# Patient Record
Sex: FEMALE | Race: WHITE | Employment: UNEMPLOYED | URBAN - METROPOLITAN AREA
[De-identification: names, ages, dates, MRNs, and addresses within clinical notes are randomized per-mention and may not be internally consistent; named-entity substitution may affect disease eponyms.]

---

## 2019-03-04 ENCOUNTER — OFFICE VISIT (OUTPATIENT)
Dept: OBGYN | Facility: CLINIC | Age: 19
End: 2019-03-04
Payer: COMMERCIAL

## 2019-03-04 VITALS
HEIGHT: 67 IN | RESPIRATION RATE: 18 BRPM | HEART RATE: 85 BPM | TEMPERATURE: 97.2 F | DIASTOLIC BLOOD PRESSURE: 85 MMHG | SYSTOLIC BLOOD PRESSURE: 123 MMHG | WEIGHT: 273 LBS | BODY MASS INDEX: 42.85 KG/M2

## 2019-03-04 DIAGNOSIS — L90.0 LICHEN SCLEROSUS: ICD-10-CM

## 2019-03-04 DIAGNOSIS — B37.31 YEAST INFECTION OF THE VAGINA: ICD-10-CM

## 2019-03-04 DIAGNOSIS — N89.8 VAGINAL DISCHARGE: Primary | ICD-10-CM

## 2019-03-04 DIAGNOSIS — E66.01 MORBID OBESITY (H): ICD-10-CM

## 2019-03-04 DIAGNOSIS — Z11.3 SCREEN FOR STD (SEXUALLY TRANSMITTED DISEASE): ICD-10-CM

## 2019-03-04 PROBLEM — F90.0 ATTENTION DEFICIT HYPERACTIVITY DISORDER (ADHD), PREDOMINANTLY INATTENTIVE TYPE: Status: ACTIVE | Noted: 2019-03-04

## 2019-03-04 PROBLEM — F34.1 DYSTHYMIA: Status: ACTIVE | Noted: 2019-03-04

## 2019-03-04 LAB
SPECIMEN SOURCE: ABNORMAL
WET PREP SPEC: ABNORMAL

## 2019-03-04 PROCEDURE — 99203 OFFICE O/P NEW LOW 30 MIN: CPT | Performed by: OBSTETRICS & GYNECOLOGY

## 2019-03-04 PROCEDURE — 87210 SMEAR WET MOUNT SALINE/INK: CPT | Performed by: OBSTETRICS & GYNECOLOGY

## 2019-03-04 PROCEDURE — 87591 N.GONORRHOEAE DNA AMP PROB: CPT | Performed by: OBSTETRICS & GYNECOLOGY

## 2019-03-04 PROCEDURE — 87491 CHLMYD TRACH DNA AMP PROBE: CPT | Performed by: OBSTETRICS & GYNECOLOGY

## 2019-03-04 RX ORDER — TOPIRAMATE 25 MG/1
25 TABLET, FILM COATED ORAL 2 TIMES DAILY
Qty: 60 TABLET | Refills: 2 | Status: SHIPPED | OUTPATIENT
Start: 2019-03-04 | End: 2019-08-14

## 2019-03-04 RX ORDER — ESCITALOPRAM OXALATE 10 MG/1
10 TABLET ORAL DAILY
COMMUNITY

## 2019-03-04 RX ORDER — ATOMOXETINE 25 MG/1
25 CAPSULE ORAL DAILY
COMMUNITY

## 2019-03-04 RX ORDER — PHENTERMINE HYDROCHLORIDE 15 MG/1
15 CAPSULE ORAL EVERY MORNING
Qty: 30 CAPSULE | Refills: 2 | Status: SHIPPED | OUTPATIENT
Start: 2019-03-04 | End: 2019-08-14

## 2019-03-04 RX ORDER — MELATONIN 10 MG
CAPSULE ORAL
COMMUNITY

## 2019-03-04 RX ORDER — ESCITALOPRAM OXALATE 20 MG/1
20 TABLET ORAL DAILY
COMMUNITY

## 2019-03-04 RX ORDER — TRIAMCINOLONE ACETONIDE 1 MG/G
OINTMENT TOPICAL 2 TIMES DAILY
Qty: 80 G | Refills: 3 | Status: SHIPPED | OUTPATIENT
Start: 2019-03-04 | End: 2020-07-24

## 2019-03-04 RX ORDER — TRIAMCINOLONE ACETONIDE 1 MG/G
OINTMENT TOPICAL 2 TIMES DAILY
COMMUNITY

## 2019-03-04 RX ORDER — LEVONORGESTREL/ETHIN.ESTRADIOL 0.1-0.02MG
1 TABLET ORAL DAILY
COMMUNITY
End: 2020-07-24

## 2019-03-04 RX ORDER — FLUCONAZOLE 150 MG/1
150 TABLET ORAL ONCE
Qty: 10 TABLET | Refills: 0 | Status: SHIPPED | OUTPATIENT
Start: 2019-03-04 | End: 2019-03-04

## 2019-03-04 SDOH — HEALTH STABILITY: MENTAL HEALTH: HOW OFTEN DO YOU HAVE A DRINK CONTAINING ALCOHOL?: NEVER

## 2019-03-04 ASSESSMENT — MIFFLIN-ST. JEOR: SCORE: 2045.95

## 2019-03-04 NOTE — PROGRESS NOTES
"Kendal is a 18 year old No obstetric history on file.  female who presents for advice regarding \"lichen sclerosis\", a diagnosis assigned by outside provider by visual inspection and complaint of chronic external vaginal itching and burning.  She states she is not sexually active, reports some white discharge; she uses Triamcinolone ointment up to twice a day, often several times per week to control her symptoms she has no fever or chills  She uses tampons and pads for a monthly 5 days menstruation.  She has no other rashes  She uses no other topical products..    Patient Active Problem List    Diagnosis Date Noted     Attention deficit hyperactivity disorder (ADHD), predominantly inattentive type 03/04/2019     Priority: Medium     Dysthymia 03/04/2019     Priority: Medium       All systems were reviewed and pertinent information in noted in subjective/HPI.    No past medical history on file.    No past surgical history on file.      Current Outpatient Medications:      atomoxetine (STRATTERA) 25 MG capsule, Take 25 mg by mouth daily, Disp: , Rfl:      escitalopram (LEXAPRO) 10 MG tablet, Take 10 mg by mouth daily, Disp: , Rfl:      escitalopram (LEXAPRO) 20 MG tablet, Take 20 mg by mouth daily, Disp: , Rfl:      fluconazole (DIFLUCAN) 150 MG tablet, Take 1 tablet (150 mg) by mouth once for 1 dose Repeat dose every 3 days x 3 doses, then weekly, Disp: 10 tablet, Rfl: 0     levonorgestrel-ethinyl estradiol (AVIANE/ALESSE/LESSINA) 0.1-20 MG-MCG tablet, Take 1 tablet by mouth daily, Disp: , Rfl:      Melatonin 10 MG CAPS, , Disp: , Rfl:      phentermine (ADIPEX-P) 15 MG capsule, Take 1 capsule (15 mg) by mouth every morning, Disp: 30 capsule, Rfl: 2     topiramate (TOPAMAX) 25 MG tablet, Take 1 tablet (25 mg) by mouth 2 times daily, Disp: 60 tablet, Rfl: 2     triamcinolone (KENALOG) 0.1 % external ointment, Apply topically 2 times daily, Disp: , Rfl:      triamcinolone (KENALOG) 0.1 % external ointment, Apply " "topically 2 times daily, Disp: 80 g, Rfl: 3    ALLERGIES:  Penicillin g    Social History     Socioeconomic History     Marital status: Unknown     Spouse name: None     Number of children: None     Years of education: None     Highest education level: None   Occupational History     None   Social Needs     Financial resource strain: None     Food insecurity:     Worry: None     Inability: None     Transportation needs:     Medical: None     Non-medical: None   Tobacco Use     Smoking status: Never Smoker     Smokeless tobacco: Never Used   Substance and Sexual Activity     Alcohol use: No     Frequency: Never     Drug use: None     Sexual activity: Not Currently     Birth control/protection: Pill   Lifestyle     Physical activity:     Days per week: None     Minutes per session: None     Stress: None   Relationships     Social connections:     Talks on phone: None     Gets together: None     Attends Muslim service: None     Active member of club or organization: None     Attends meetings of clubs or organizations: None     Relationship status: None     Intimate partner violence:     Fear of current or ex partner: None     Emotionally abused: None     Physically abused: None     Forced sexual activity: None   Other Topics Concern     None   Social History Narrative     None       No family history on file.    OBJECTIVE:  Vitals: /85 (BP Location: Right arm, Patient Position: Chair, Cuff Size: Adult Large)   Pulse 85   Temp 97.2  F (36.2  C) (Tympanic)   Resp 18   Ht 1.702 m (5' 7\")   Wt 123.8 kg (273 lb)   LMP 02/11/2019   BMI 42.76 kg/m   BMI= Body mass index is 42.76 kg/m .   Patient's last menstrual period was 02/11/2019.     GENERAL APPEARANCE: overweight, nonhirsuit appearing  ABDOMEN:  soft, nontender, no hepato-splenomegaly or hernias  PELVIC:  EGBUS:  Thinned labia but no leukoplakia or changes classic for LSA  VAGINA:  Erythematous walls with flocculent white discharge; wet " mount--yeast  CERVIX:  smooth, non-friable, no gross lesions, thin-layer PAP was not taken   UTERUS:  anteverted, not enlarged, non tender  ADNEXAE:  non-tender, no masses palpable, no cul de sac nodularity    ASSESSMENT:      ICD-10-CM    1. Vaginal discharge N89.8 Wet prep   2. Lichen sclerosus L90.0 triamcinolone (KENALOG) 0.1 % external ointment   3. Yeast infection of the vagina B37.3 fluconazole (DIFLUCAN) 150 MG tablet   4. Morbid obesity (H) E66.01 phentermine (ADIPEX-P) 15 MG capsule     topiramate (TOPAMAX) 25 MG tablet   5. Screen for STD (sexually transmitted disease) Z11.3 Chlamydia trachomatis PCR     Neisseria gonorrhoeae PCR       PLAN:  I discussed at length with Elma that her symptoms may be more of a yeast issue than true LSA; I would suggest an aggressive tx with Fluconazole and use of Triamcinolone very judiciously  I also suggest an effort to assist her in weight loss; we discussed healthy eating and activity, and use of Phentermine 15mg po every day and Topamax 25mg po BID (start lower dose) with f/u in 3 months to see if can increase dose; pt is amenable      Duration of visit:  30 minutes, >50% in discussion of current issues, treatment options and treatment planning.  JOE Child MD

## 2019-03-05 LAB
C TRACH DNA SPEC QL NAA+PROBE: NEGATIVE
N GONORRHOEA DNA SPEC QL NAA+PROBE: NEGATIVE
SPECIMEN SOURCE: NORMAL
SPECIMEN SOURCE: NORMAL

## 2019-03-06 ENCOUNTER — TELEPHONE (OUTPATIENT)
Dept: OBGYN | Facility: CLINIC | Age: 19
End: 2019-03-06

## 2019-03-06 NOTE — TELEPHONE ENCOUNTER
Prior Authorization Retail Medication Request    Medication/Dose: Phentermine HCL  ICD code (if different than what is on RX):  Morbid obesity (H) [E66.01]   Previously Tried and Failed:  I also suggest an effort to assist her in weight loss; we discussed healthy eating and activity, and use of Phentermine 15mg po every day and Topamax 25mg po BID (start lower dose) with f/u in 3 months to see if can increase dose; pt is amenable  Rationale:      Insurance Name:  [employer]   Insurance ID:  Key [id number]      Pharmacy Information (if different than what is on RX)  [facility]  [facility]  Phone:  [phone]

## 2019-03-11 NOTE — TELEPHONE ENCOUNTER
Central Prior Authorization Team   Phone: [phone]      PA Initiation    Medication: Phentermine HCL  Insurance Company: [employer] - Phone [phone] Fax [phone]  Pharmacy Filling the Rx: [id number] - [place], [place] - [address]  Filling Pharmacy Phone: [phone]  Filling Pharmacy Fax:    Start Date: 3/11/2019

## 2019-03-11 NOTE — TELEPHONE ENCOUNTER
PRIOR AUTHORIZATION DENIED    Medication: Phentermine HCL    Denial Date: 3/11/2019    Denial Rational:      Appeal Information:    If you would like to appeal, please supply P/A team with a letter of medical necessity with clinical reason.

## 2019-03-12 NOTE — TELEPHONE ENCOUNTER
Medication Appeal Initiation    We have initiated an appeal for the requested medication:  Medication: Phentermine HCL  Appeal Start Date:  3/12/2019  Insurance Company: [employer] - Phone [phone] Fax [phone]  Comments:

## 2019-03-13 NOTE — TELEPHONE ENCOUNTER
MEDICATION APPEAL DENIED    Medication: Phentermine HCL    Denial Date: 3/13/2019    Denial Rational:  Per insurance, medication is excluded from patients benefit plan and will not be covered.      Second Level Appeal Information:      Second level appeals will be managed by the clinic staff and provider. Please contact the [employer] Prior Authorization Team if additional information about the denial is needed.

## 2019-03-14 NOTE — TELEPHONE ENCOUNTER
Pls notify pt that her insurance has denied the Phentermine even after appeal. She will need to decide if she can afford to pay out of pocket   Vanessa Mericle    Routing Comment      Call to patient to notify of above.  Patient gave permission to discuss with mother. Mother notified of denial. She will pay out of pocket and  med at the pharmacy.    Norma Olson   [facility]  RN

## 2019-06-25 DIAGNOSIS — E66.01 MORBID OBESITY (H): ICD-10-CM

## 2019-06-25 NOTE — TELEPHONE ENCOUNTER
Please review and advise on patient request for refill.  Outside of RN guidelines for refill.    Thank you.    Norma Olson   [facility]  RN

## 2019-06-26 RX ORDER — TOPIRAMATE 25 MG/1
25 TABLET, FILM COATED ORAL 2 TIMES DAILY
Qty: 60 TABLET | Refills: 2 | OUTPATIENT
Start: 2019-06-26

## 2019-06-28 NOTE — TELEPHONE ENCOUNTER
Pt was advised that rx for Topamax was denied and that she needs to be seen for an office visit.    Jenny Gilliam  [facility] RN

## 2019-07-03 DIAGNOSIS — E66.01 MORBID OBESITY (H): ICD-10-CM

## 2019-07-03 NOTE — TELEPHONE ENCOUNTER
Refill request received again for Topiramate.  Prescription denied by Dr. Child as patient needs to be seen for follow up.  Pharmacy notified.  Patient notified 6/28/19.    Norma Olson   [facility]  [facility]

## 2019-08-14 ENCOUNTER — OFFICE VISIT (OUTPATIENT)
Dept: OBGYN | Facility: CLINIC | Age: 19
End: 2019-08-14
Payer: MEDICARE

## 2019-08-14 ENCOUNTER — TELEPHONE (OUTPATIENT)
Dept: OBGYN | Facility: CLINIC | Age: 19
End: 2019-08-14

## 2019-08-14 VITALS
DIASTOLIC BLOOD PRESSURE: 74 MMHG | TEMPERATURE: 97.7 F | WEIGHT: 258.5 LBS | RESPIRATION RATE: 18 BRPM | HEART RATE: 90 BPM | SYSTOLIC BLOOD PRESSURE: 121 MMHG | BODY MASS INDEX: 40.57 KG/M2 | HEIGHT: 67 IN

## 2019-08-14 DIAGNOSIS — E66.01 MORBID OBESITY (H): ICD-10-CM

## 2019-08-14 PROCEDURE — 99213 OFFICE O/P EST LOW 20 MIN: CPT | Performed by: OBSTETRICS & GYNECOLOGY

## 2019-08-14 RX ORDER — TOPIRAMATE 50 MG/1
50 TABLET, FILM COATED ORAL 2 TIMES DAILY
Qty: 180 TABLET | Refills: 3 | Status: SHIPPED | OUTPATIENT
Start: 2019-08-14

## 2019-08-14 ASSESSMENT — MIFFLIN-ST. JEOR: SCORE: 1980.18

## 2019-08-14 NOTE — NURSING NOTE
"Initial /74 (BP Location: Right arm, Patient Position: Chair, Cuff Size: Adult Large)   Pulse 90   Temp 97.7  F (36.5  C) (Tympanic)   Resp 18   Ht 1.702 m (5' 7\")   Wt 117.3 kg (258 lb 8 oz)   LMP 07/30/2019   Breastfeeding? No   BMI 40.49 kg/m   Estimated body mass index is 40.49 kg/m  as calculated from the following:    Height as of this encounter: 1.702 m (5' 7\").    Weight as of this encounter: 117.3 kg (258 lb 8 oz). .    Cherie Saavedra, BETI    "

## 2019-08-14 NOTE — TELEPHONE ENCOUNTER
Prior Authorization Retail Medication Request    Medication/Dose: Topiramate 50 mg  ICD code (if different than what is on RX):    Previously Tried and Failed:    Rationale:      Insurance Name:  [employer]  Insurance ID:  [id number]       Pharmacy Information (if different than what is on RX)  [facility]  [facility] [place]  Phone:  [phone]

## 2019-08-14 NOTE — PROGRESS NOTES
Elma is a 18 year old No obstetric history on file.  female who presents for f/u of weight loss from Topamax 25mg po BID; she never took Phentermine as too expensive; she has lost 15 lbs, rides her bike daily, tries to avoid certain foods, but admits she can do better  She has had some episodic headaches; is currently on 20mcg E2 containing BCP.    Patient Active Problem List    Diagnosis Date Noted     Attention deficit hyperactivity disorder (ADHD), predominantly inattentive type 03/04/2019     Priority: Medium     Dysthymia 03/04/2019     Priority: Medium     Morbid obesity (H) 03/04/2019     Priority: Medium     On Phentermine/Topamax since 3/19       Lichen sclerosus 03/04/2019     Priority: Medium       All systems were reviewed and pertinent information in noted in subjective/HPI.    Past Medical History:   Diagnosis Date     Active autistic disorder     10/14/2013     Attention deficit disorder with hyperactivity     10/14/2013     Condition influencing health status     she is a twin     Disturbance of skin sensation     10/14/2013     IBS (irritable bowel syndrome)      Nonpsychotic mental disorder     No Comments Provided     Personality disorder (H)     10/14/2013       Past Surgical History:   Procedure Laterality Date     CHOLECYSTECTOMY       OTHER SURGICAL HISTORY      [id number],NO PREVIOUS SURGERY         Current Outpatient Medications:      atomoxetine (STRATTERA) 25 MG capsule, Take 25 mg by mouth daily, Disp: , Rfl:      escitalopram (LEXAPRO) 10 MG tablet, Take 10 mg by mouth daily, Disp: , Rfl:      escitalopram (LEXAPRO) 20 MG tablet, Take 20 mg by mouth daily, Disp: , Rfl:      levonorgestrel-ethinyl estradiol (AVIANE/ALESSE/LESSINA) 0.1-20 MG-MCG tablet, Take 1 tablet by mouth daily, Disp: , Rfl:      Melatonin 10 MG CAPS, , Disp: , Rfl:      topiramate (TOPAMAX) 50 MG tablet, Take 1 tablet (50 mg) by mouth 2 times daily, Disp: 180 tablet, Rfl: 3     triamcinolone (KENALOG) 0.1 % external  ointment, Apply topically 2 times daily, Disp: , Rfl:      triamcinolone (KENALOG) 0.1 % external ointment, Apply topically 2 times daily, Disp: 80 g, Rfl: 3    ALLERGIES:  Penicillin g    Social History     Socioeconomic History     Marital status: Unknown     Spouse name: None     Number of children: None     Years of education: None     Highest education level: None   Occupational History     None   Social Needs     Financial resource strain: None     Food insecurity:     Worry: None     Inability: None     Transportation needs:     Medical: None     Non-medical: None   Tobacco Use     Smoking status: Never Smoker     Smokeless tobacco: Never Used   Substance and Sexual Activity     Alcohol use: No     Frequency: Never     Drug use: Unknown     Types: Other     Comment: Drug use: No     Sexual activity: Not Currently     Birth control/protection: Pill   Lifestyle     Physical activity:     Days per week: None     Minutes per session: None     Stress: None   Relationships     Social connections:     Talks on phone: None     Gets together: None     Attends Orthodoxy service: None     Active member of club or organization: None     Attends meetings of clubs or organizations: None     Relationship status: None     Intimate partner violence:     Fear of current or ex partner: None     Emotionally abused: None     Physically abused: None     Forced sexual activity: None   Other Topics Concern     None   Social History Narrative    ** Merged History Encounter **         10/21/2013  Living at [facility], for anger/behavior issues.  When she's at home, she lives with mom, step-dad and step-sibling.  3/21/2014  Family lives near [place].  7/15/2014  She will be discharging from [facility] on August 1, 2014, after that w    ill be living with a foster home in [place], [place]. After several months with a foster home she may be able to go back home with her parents.       Family History   Problem Relation Age of Onset  "    Other - See Comments Other         irritable bowel in a cousin and aunt     Other - See Comments Mother         migraines       OBJECTIVE:  Vitals: /74 (BP Location: Right arm, Patient Position: Chair, Cuff Size: Adult Large)   Pulse 90   Temp 97.7  F (36.5  C) (Tympanic)   Resp 18   Ht 1.702 m (5' 7\")   Wt 117.3 kg (258 lb 8 oz)   LMP 07/30/2019   Breastfeeding? No   BMI 40.49 kg/m   BMI= Body mass index is 40.49 kg/m .   Patient's last menstrual period was 07/30/2019.     GENERAL APPEARANCE: healthy, alert and no distress    ASSESSMENT:      ICD-10-CM    1. Morbid obesity (H) E66.01 topiramate (TOPAMAX) 50 MG tablet       PLAN:  I recommend increasing the Topamax to 50mg po BID, especially since she is having headaches; we discussed that if her headaches continue, should change to progestin only BCP  Discussed dietary adjustments, drinking lots of water and continueing her daily exercise'f/u 6 months.    Joe Chery MD    Duration of visit:  15 minutes, 100% in discussion of current issues, treatment options and treatment planning.  JOE CHERY MD      "

## 2019-08-20 NOTE — TELEPHONE ENCOUNTER
Received fax from  to complete on Topiramate request.  Form filled out and faxed back to  - await response.    -Neida EVANS [facility]  [facility] [place]

## 2019-08-20 NOTE — TELEPHONE ENCOUNTER
"Received response from [employer]  \"This [employer] member is enrolled in a limited plan that covers Part B, Anti-Psychotic and OTC's\"    Pt informed.    -Neida Siegel  Clinic Station         "

## 2019-08-22 NOTE — TELEPHONE ENCOUNTER
Sabrina is calling and states that this PA should be submitted to [employer] (pt's prescription coverage)    Please fax PA to:  [phone]    Phone #:  [phone]    Denise Behrendt  [facility] CSS

## 2019-08-22 NOTE — TELEPHONE ENCOUNTER
CENTRAL PRIOR AUTHORIZATION  [phone]    PA Initiation    Medication: Topiramate  Insurance Company: [employer] - Phone [phone] Fax [phone]  Pharmacy Filling the Rx:    Filling Pharmacy Phone:    Filling Pharmacy Fax:    Start Date: 8/22/2019

## 2019-08-23 NOTE — TELEPHONE ENCOUNTER
Prior Authorization Not Needed per Insurance    Medication: Topiramate - PA Not Needed  Insurance Company: [employer] - Phone [phone] Fax [phone]  Expected CoPay:      Pharmacy Filling the Rx: [id number] - [place], [place] - [address]  Pharmacy Notified: Yes They were able to get it processed.  Patient Notified: Yes

## 2020-07-24 ENCOUNTER — VIRTUAL VISIT (OUTPATIENT)
Dept: OBGYN | Facility: CLINIC | Age: 20
End: 2020-07-24
Payer: MEDICARE

## 2020-07-24 VITALS — BODY MASS INDEX: 35.87 KG/M2 | WEIGHT: 229 LBS

## 2020-07-24 DIAGNOSIS — Z30.41 ENCOUNTER FOR SURVEILLANCE OF CONTRACEPTIVE PILLS: ICD-10-CM

## 2020-07-24 DIAGNOSIS — L90.0 LICHEN SCLEROSUS: ICD-10-CM

## 2020-07-24 DIAGNOSIS — E66.01 MORBID OBESITY (H): Primary | ICD-10-CM

## 2020-07-24 PROCEDURE — 99442 ZZC PHYSICIAN TELEPHONE EVALUATION 11-20 MIN: CPT | Performed by: OBSTETRICS & GYNECOLOGY

## 2020-07-24 RX ORDER — LEVONORGESTREL/ETHIN.ESTRADIOL 0.1-0.02MG
1 TABLET ORAL DAILY
Qty: 84 TABLET | Refills: 3 | Status: SHIPPED | OUTPATIENT
Start: 2020-07-24

## 2020-07-24 RX ORDER — TOPIRAMATE 50 MG/1
50 TABLET, FILM COATED ORAL DAILY
Qty: 90 TABLET | Refills: 3 | Status: SHIPPED | OUTPATIENT
Start: 2020-07-24

## 2020-07-24 RX ORDER — LURASIDONE HYDROCHLORIDE 20 MG/1
TABLET, FILM COATED ORAL
COMMUNITY

## 2020-07-24 RX ORDER — TRIAMCINOLONE ACETONIDE 1 MG/G
OINTMENT TOPICAL 2 TIMES DAILY
Qty: 80 G | Refills: 3 | Status: SHIPPED | OUTPATIENT
Start: 2020-07-24

## 2020-07-24 NOTE — PROGRESS NOTES
"Elma King is a 19 year old female who is being evaluated via a billable telephone visit.      The patient has been notified of following:     \"This telephone visit will be conducted via a call between you and your physician/provider. We have found that certain health care needs can be provided without the need for a physical exam.  This service lets us provide the care you need with a short phone conversation.  If a prescription is necessary we can send it directly to your pharmacy.  If lab work is needed we can place an order for that and you can then stop by our lab to have the test done at a later time.    Telephone visits are billed at different rates depending on your insurance coverage. During this emergency period, for some insurers they may be billed the same as an in-person visit.  Please reach out to your insurance provider with any questions.    If during the course of the call the physician/provider feels a telephone visit is not appropriate, you will not be charged for this service.\"    Patient has given verbal consent for Telephone visit?  Yes    What phone number would you like to be contacted at? [phone] Hyun is mom and Elma will be there at 3:00.    How would you like to obtain your AVS? Mail a copy    Phone call duration: 15 minutes    Vanessa Child MD    Elma is a 19 year old   female who presents for virtual visit to review for med refills; she is currentlyl taking a low dose BCP, Topamax 50mg every day (pt decreased from BID) and Triamcinolone ointment prn lichen sclerosis symptoms.  She states 2 weeks ago her weight was measured at 230 lbs, which means she has lost 43 lbs since starting the Topamax and seems to be keeping it off as well; she would like to remain on the Topamax.  She has regular menses with her BCP and she uses the Triamcinolone sparingly like she was advised.  She reports no problems.    Patient Active Problem List    Diagnosis Date Noted     " Attention deficit hyperactivity disorder (ADHD), predominantly inattentive type 03/04/2019     Priority: Medium     Dysthymia 03/04/2019     Priority: Medium     Morbid obesity (H) 03/04/2019     Priority: Medium     On Phentermine/Topamax since 3/19       Lichen sclerosus 03/04/2019     Priority: Medium       All systems were reviewed and pertinent information in noted in subjective/HPI.    Past Medical History:   Diagnosis Date     Active autistic disorder     10/14/2013     Attention deficit disorder with hyperactivity     10/14/2013     Condition influencing health status     she is a twin     Disturbance of skin sensation     10/14/2013     IBS (irritable bowel syndrome)      Nonpsychotic mental disorder     No Comments Provided     Personality disorder (H)     10/14/2013       Past Surgical History:   Procedure Laterality Date     CHOLECYSTECTOMY       OTHER SURGICAL HISTORY      [id number],NO PREVIOUS SURGERY         Current Outpatient Medications:      atomoxetine (STRATTERA) 25 MG capsule, Take 25 mg by mouth daily, Disp: , Rfl:      escitalopram (LEXAPRO) 10 MG tablet, Take 10 mg by mouth daily, Disp: , Rfl:      levonorgestrel-ethinyl estradiol (AVIANE) 0.1-20 MG-MCG tablet, Take 1 tablet by mouth daily, Disp: 84 tablet, Rfl: 3     lurasidone (LATUDA) 20 MG TABS tablet, Take by mouth daily with food, Disp: , Rfl:      Melatonin 10 MG CAPS, , Disp: , Rfl:      topiramate (TOPAMAX) 50 MG tablet, Take 1 tablet (50 mg) by mouth daily, Disp: 90 tablet, Rfl: 3     topiramate (TOPAMAX) 50 MG tablet, Take 1 tablet (50 mg) by mouth 2 times daily (Patient taking differently: Take 25 mg by mouth 2 times daily ), Disp: 180 tablet, Rfl: 3     triamcinolone (KENALOG) 0.1 % external ointment, Apply topically 2 times daily, Disp: 80 g, Rfl: 3     triamcinolone (KENALOG) 0.1 % external ointment, Apply topically 2 times daily, Disp: , Rfl:      escitalopram (LEXAPRO) 20 MG tablet, Take 20 mg by mouth daily, Disp: , Rfl:      ALLERGIES:  Penicillin g    Social History     Socioeconomic History     Marital status: Unknown     Spouse name: None     Number of children: None     Years of education: None     Highest education level: None   Occupational History     None   Social Needs     Financial resource strain: None     Food insecurity     Worry: None     Inability: None     Transportation needs     Medical: None     Non-medical: None   Tobacco Use     Smoking status: Never Smoker     Smokeless tobacco: Never Used   Substance and Sexual Activity     Alcohol use: No     Frequency: Never     Drug use: Unknown     Types: Other     Comment: Drug use: No     Sexual activity: Not Currently     Birth control/protection: Pill   Lifestyle     Physical activity     Days per week: None     Minutes per session: None     Stress: None   Relationships     Social connections     Talks on phone: None     Gets together: None     Attends Voodoo service: None     Active member of club or organization: None     Attends meetings of clubs or organizations: None     Relationship status: None     Intimate partner violence     Fear of current or ex partner: None     Emotionally abused: None     Physically abused: None     Forced sexual activity: None   Other Topics Concern     None   Social History Narrative    ** Merged History Encounter **         10/21/2013  Living at [facility], for anger/behavior issues.  When she's at home, she lives with mom, step-dad and step-sibling.  3/21/2014  Family lives near [place].  7/15/2014  She will be discharging from [facility] on August 1, 2014, after that w    ill be living with a foster home in [place], [place]. After several months with a foster home she may be able to go back home with her parents.       Family History   Problem Relation Age of Onset     Other - See Comments Other         irritable bowel in a cousin and aunt     Other - See Comments Mother         migraines       OBJECTIVE:  Vitals: Wt 103.9  kg (229 lb)   LMP 06/26/2020   Breastfeeding No   BMI 35.87 kg/m   BMI= Body mass index is 35.87 kg/m .   Patient's last menstrual period was 06/26/2020.       ASSESSMENT:      ICD-10-CM    1. Morbid obesity (H)  E66.01 topiramate (TOPAMAX) 50 MG tablet   2. Lichen sclerosus  L90.0 triamcinolone (KENALOG) 0.1 % external ointment   3. Encounter for surveillance of contraceptive pills  Z30.41 levonorgestrel-ethinyl estradiol (AVIANE) 0.1-20 MG-MCG tablet       PLAN:  I refilled the BCP, Topamax and Triamcinolone ointment; I discussed out goals, and praised her for her sustained weight loss this past year.f/u advised prn  Joe Child MD  [facility]  Duration of visit:  15 minutes, 100% in discussion of current issues, treatment options and treatment planning.  JOE Child MD